# Patient Record
Sex: FEMALE | Race: OTHER | NOT HISPANIC OR LATINO
[De-identification: names, ages, dates, MRNs, and addresses within clinical notes are randomized per-mention and may not be internally consistent; named-entity substitution may affect disease eponyms.]

---
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
## 2022-08-10 PROBLEM — Z78.9 OTHER SPECIFIED HEALTH STATUS: Chronic | Status: ACTIVE | Noted: 2019-08-26

## 2022-09-05 NOTE — ED PROVIDER NOTE - CROS ED SKIN ALL NEG
negative -  no rash Fever 100.9 rectally a/w chills on 9/5 AM. Reports diarrhea x 3 episodes since 9/4. Also c/o RLQ abd pain w/ radiation R flank. No CVA tenderness on exam. Denies cough, sore throat, URI symptoms. No leukocytosis.   -Poor dentition w/ mild redness/swelling L/R upper molar. Reports recent dental work 2 weeks ago, did not receive Abx prior (Hx TAVR 2019). Was feeling well prior to dental work  -Concern for possible endocarditis given recent dental work and Hx TAVR  -IRMA in AM r/o endocarditis  -COVID neg. F/u add-on RVP  -F/u BC x2 sent 9/5   -F/u UA  -Monitor diarrhea, will send GI-PCR if recurs  -F/u repeat CXR.  -CT Chest 9/4: Probable mild intersitial pulm edema & bronchitis. Stable or slightly increased multiple enlarged mediastinal & hilar nodules likely 2/2 inflammatory or infectious etiology Fever 100.9 rectally a/w chills on 9/5 AM, generalized malaise. Reports diarrhea x 3 episodes since 9/4. Also c/o RLQ abd pain w/ radiation R flank. No CVA tenderness on exam. Denies cough, sore throat, URI symptoms. No leukocytosis.   -Poor dentition w/ mild redness/swelling L/R upper molar. Reports recent dental work 2 weeks ago, did not receive Abx prior (Hx TAVR 2019). Was feeling well prior to dental work  -Concern for possible endocarditis given recent dental work and Hx TAVR  -IRMA in AM r/o endocarditis  -COVID neg. F/u add-on RVP  -F/u BC x2 sent 9/5   -F/u UA  -Monitor diarrhea, will send GI-PCR if recurs  -F/u repeat CXR.  -CT Chest 9/4: Probable mild intersitial pulm edema & bronchitis. Stable or slightly increased multiple enlarged mediastinal & hilar nodules likely 2/2 inflammatory or infectious etiology Fever 100.9 rectally a/w chills on 9/5 AM, generalized malaise. Reports diarrhea x 3 episodes since 9/4. Also c/o RLQ abd pain w/ radiation R flank. No CVA tenderness on exam. Denies dysuria, urinary frequenct, cough, sore throat, URI symptoms. No leukocytosis.   -Poor dentition w/ mild redness/swelling L/R upper molar. Reports recent dental work 2 weeks ago, did not receive Abx prior (Hx TAVR 2019). Was feeling well prior to dental work  -Concern for possible endocarditis given recent dental work and Hx TAVR  -IRMA in AM r/o endocarditis  -COVID and RVP neg   -F/u BC x2 sent 9/5   -F/u UA  -Monitor diarrhea, will send GI-PCR if recurs  -F/u repeat CXR.  -CT Chest 9/4: Probable mild intersitial pulm edema & bronchitis. Stable or slightly increased multiple enlarged mediastinal & hilar nodules likely 2/2 inflammatory or infectious etiology

## 2022-10-25 ENCOUNTER — APPOINTMENT (OUTPATIENT)
Dept: FAMILY MEDICINE | Facility: CLINIC | Age: 22
End: 2022-10-25

## 2022-11-25 ENCOUNTER — NON-APPOINTMENT (OUTPATIENT)
Age: 22
End: 2022-11-25

## 2023-01-05 ENCOUNTER — NON-APPOINTMENT (OUTPATIENT)
Age: 23
End: 2023-01-05

## 2023-08-29 NOTE — ED PEDIATRIC NURSE NOTE - NS ED NURSE LEVEL OF CONSCIOUSNESS SPEECH
Speaking Coherently/Age appropriate Complex Repair And Single Advancement Flap Text: The defect edges were debeveled with a #15 scalpel blade.  The primary defect was closed partially with a complex linear closure.  Given the location of the remaining defect, shape of the defect and the proximity to free margins a single advancement flap was deemed most appropriate for complete closure of the defect.  Using a sterile surgical marker, an appropriate advancement flap was drawn incorporating the defect and placing the expected incisions within the relaxed skin tension lines where possible.    The area thus outlined was incised deep to adipose tissue with a #15 scalpel blade.  The skin margins were undermined to an appropriate distance in all directions utilizing iris scissors.

## 2024-01-25 NOTE — ED PEDIATRIC NURSE NOTE - NS CRAFFT PART A3 ALCOHOL
Detail Level: Detailed Quality 226: Preventive Care And Screening: Tobacco Use: Screening And Cessation Intervention: Patient screened for tobacco use and is an ex/non-smoker No

## 2025-02-10 ENCOUNTER — NON-APPOINTMENT (OUTPATIENT)
Age: 25
End: 2025-02-10